# Patient Record
Sex: FEMALE | Race: WHITE | NOT HISPANIC OR LATINO
[De-identification: names, ages, dates, MRNs, and addresses within clinical notes are randomized per-mention and may not be internally consistent; named-entity substitution may affect disease eponyms.]

---

## 2023-08-14 ENCOUNTER — APPOINTMENT (OUTPATIENT)
Dept: ORTHOPEDIC SURGERY | Facility: CLINIC | Age: 68
End: 2023-08-14
Payer: MEDICARE

## 2023-08-14 VITALS — HEIGHT: 65 IN | BODY MASS INDEX: 31.65 KG/M2 | WEIGHT: 190 LBS

## 2023-08-14 DIAGNOSIS — S52.551A OTHER EXTRAARTICULAR FRACTURE OF LOWER END OF RIGHT RADIUS, INITIAL ENCOUNTER FOR CLOSED FRACTURE: ICD-10-CM

## 2023-08-14 PROBLEM — Z00.00 ENCOUNTER FOR PREVENTIVE HEALTH EXAMINATION: Status: ACTIVE | Noted: 2023-08-14

## 2023-08-14 PROCEDURE — 73110 X-RAY EXAM OF WRIST: CPT | Mod: RT

## 2023-08-14 PROCEDURE — 99202 OFFICE O/P NEW SF 15 MIN: CPT

## 2023-08-14 RX ORDER — IBUPROFEN 800 MG/1
800 TABLET, FILM COATED ORAL EVERY 8 HOURS
Qty: 42 | Refills: 0 | Status: ACTIVE | COMMUNITY
Start: 2023-08-14 | End: 1900-01-01

## 2023-08-14 RX ORDER — OXYCODONE AND ACETAMINOPHEN 5; 325 MG/1; MG/1
5-325 TABLET ORAL EVERY 6 HOURS
Qty: 20 | Refills: 0 | Status: ACTIVE | COMMUNITY
Start: 2023-08-14 | End: 1900-01-01

## 2023-08-14 NOTE — ASSESSMENT
[FreeTextEntry1] : Patient is a right-sided extra-articular distal radius fracture she will be kept in her brace.  We did discuss the use of a cast.  I think treatment wise either is acceptable.  She is going to try the brace.  If she finds she is doing too much she may call the office to be converted to a cast.  She should see us in 3 weeks with a repeat radiograph of the right wrist

## 2023-08-14 NOTE — DATA REVIEWED
[FreeTextEntry1] : Radiographs 3 views of the right wrist reviewed showing a metaphyseal nondisplaced right-sided distal radius fracture.

## 2023-08-14 NOTE — PHYSICAL EXAM
[de-identified] : Patient has ecchymoses.  Patient has good range of motion to the fingers.  She has mild swelling present she has normal sensation normal cap refill.

## 2023-09-05 ENCOUNTER — APPOINTMENT (OUTPATIENT)
Dept: ORTHOPEDIC SURGERY | Facility: CLINIC | Age: 68
End: 2023-09-05
Payer: MEDICARE

## 2023-09-05 PROCEDURE — L3908: CPT | Mod: KX,RT

## 2023-09-05 PROCEDURE — 73110 X-RAY EXAM OF WRIST: CPT | Mod: RT

## 2023-09-05 PROCEDURE — 99213 OFFICE O/P EST LOW 20 MIN: CPT

## 2023-09-05 NOTE — PHYSICAL EXAM
[de-identified] : Patient is able to flex and extend the fingers well and she has good supination pronation.  And good early range of motion to the wrist.

## 2023-09-05 NOTE — HISTORY OF PRESENT ILLNESS
[de-identified] : 68-year-old female status post fall resulting in a right wrist fracture she comes in today for evaluation.  She is in a splint.  She is tolerating the splint well.

## 2023-09-05 NOTE — ASSESSMENT
[FreeTextEntry1] : Patient is healing her right-sided wrist fracture well.  She will see us back in the 3 weeks with a repeat radiograph of the right wrist.

## 2023-09-05 NOTE — DATA REVIEWED
[FreeTextEntry1] : Radiographs 3 views of the right wrist reviewed showing acceptable position alignment of a right-sided distal radius fracture.

## 2023-09-25 ENCOUNTER — APPOINTMENT (OUTPATIENT)
Dept: ORTHOPEDIC SURGERY | Facility: CLINIC | Age: 68
End: 2023-09-25
Payer: MEDICARE

## 2023-09-25 VITALS — BODY MASS INDEX: 31.65 KG/M2 | HEIGHT: 65 IN | WEIGHT: 190 LBS

## 2023-09-25 DIAGNOSIS — S52.551D OTHER EXTRAARTICULAR FRACTURE OF LOWER END OF RIGHT RADIUS, SUBSEQUENT ENCOUNTER FOR CLOSED FRACTURE WITH ROUTINE HEALING: ICD-10-CM

## 2023-09-25 PROCEDURE — 73110 X-RAY EXAM OF WRIST: CPT | Mod: RT

## 2023-09-25 PROCEDURE — 99213 OFFICE O/P EST LOW 20 MIN: CPT
